# Patient Record
Sex: FEMALE | URBAN - METROPOLITAN AREA
[De-identification: names, ages, dates, MRNs, and addresses within clinical notes are randomized per-mention and may not be internally consistent; named-entity substitution may affect disease eponyms.]

---

## 2020-06-12 ENCOUNTER — OFFICE VISIT (OUTPATIENT)
Dept: PRIMARY CARE CLINIC | Age: 40
End: 2020-06-12

## 2020-06-12 PROCEDURE — 99211 OFF/OP EST MAY X REQ PHY/QHP: CPT | Performed by: NURSE PRACTITIONER

## 2020-06-12 NOTE — PROGRESS NOTES
Possible COVID-19 exposure with self-quarantine, isolation instructions and management of symptoms, and to follow-up with primary care physician or emergency department if worsens  Office Visit on 06/12/2020   Component Date Value Ref Range Status    SARS-CoV-2 06/12/2020 Not Detected  Not Detected Final    Source 06/12/2020 NP swab   Final     I, Mario Pagan, personally performed the services described in the documentation as scribed by Jigar Laughlin, in my presence and it is both accurate and complete.   Electronically signed by ELIN Troncoso CNP on 6/28/2020 at 2:25 PM.

## 2020-06-15 LAB
SARS-COV-2: NOT DETECTED
SOURCE: NORMAL